# Patient Record
Sex: MALE | Race: ASIAN | Employment: FULL TIME | URBAN - METROPOLITAN AREA
[De-identification: names, ages, dates, MRNs, and addresses within clinical notes are randomized per-mention and may not be internally consistent; named-entity substitution may affect disease eponyms.]

---

## 2017-12-19 ENCOUNTER — HOSPITAL ENCOUNTER (EMERGENCY)
Facility: CLINIC | Age: 42
Discharge: HOME | End: 2017-12-19
Attending: EMERGENCY MEDICINE | Admitting: EMERGENCY MEDICINE
Payer: COMMERCIAL

## 2017-12-19 VITALS
SYSTOLIC BLOOD PRESSURE: 123 MMHG | HEART RATE: 84 BPM | OXYGEN SATURATION: 95 % | TEMPERATURE: 98.9 F | RESPIRATION RATE: 20 BRPM | DIASTOLIC BLOOD PRESSURE: 94 MMHG

## 2017-12-19 DIAGNOSIS — R50.9 FEBRILE ILLNESS, ACUTE: ICD-10-CM

## 2017-12-19 DIAGNOSIS — E87.6 HYPOKALEMIA: ICD-10-CM

## 2017-12-19 LAB
ANION GAP SERPL CALCULATED.3IONS-SCNC: 7 MMOL/L (ref 3–14)
BUN SERPL-MCNC: 12 MG/DL (ref 7–30)
CALCIUM SERPL-MCNC: 8.4 MG/DL (ref 8.5–10.1)
CHLORIDE SERPL-SCNC: 97 MMOL/L (ref 94–109)
CO2 SERPL-SCNC: 29 MMOL/L (ref 20–32)
CREAT SERPL-MCNC: 1.17 MG/DL (ref 0.66–1.25)
FLUAV+FLUBV AG SPEC QL: NEGATIVE
FLUAV+FLUBV AG SPEC QL: NEGATIVE
GFR SERPL CREATININE-BSD FRML MDRD: 68 ML/MIN/1.7M2
GLUCOSE SERPL-MCNC: 136 MG/DL (ref 70–99)
LACTATE BLD-SCNC: 1.4 MMOL/L (ref 0.7–2)
POTASSIUM SERPL-SCNC: 2.8 MMOL/L (ref 3.4–5.3)
SODIUM SERPL-SCNC: 133 MMOL/L (ref 133–144)
SPECIMEN SOURCE: NORMAL

## 2017-12-19 PROCEDURE — 36415 COLL VENOUS BLD VENIPUNCTURE: CPT | Performed by: EMERGENCY MEDICINE

## 2017-12-19 PROCEDURE — 80048 BASIC METABOLIC PNL TOTAL CA: CPT | Performed by: EMERGENCY MEDICINE

## 2017-12-19 PROCEDURE — 87040 BLOOD CULTURE FOR BACTERIA: CPT | Performed by: EMERGENCY MEDICINE

## 2017-12-19 PROCEDURE — 83605 ASSAY OF LACTIC ACID: CPT | Performed by: EMERGENCY MEDICINE

## 2017-12-19 PROCEDURE — 87804 INFLUENZA ASSAY W/OPTIC: CPT | Performed by: EMERGENCY MEDICINE

## 2017-12-19 PROCEDURE — 96365 THER/PROPH/DIAG IV INF INIT: CPT

## 2017-12-19 PROCEDURE — 25000128 H RX IP 250 OP 636: Performed by: EMERGENCY MEDICINE

## 2017-12-19 PROCEDURE — 25000132 ZZH RX MED GY IP 250 OP 250 PS 637: Performed by: EMERGENCY MEDICINE

## 2017-12-19 PROCEDURE — 99284 EMERGENCY DEPT VISIT MOD MDM: CPT | Mod: 25

## 2017-12-19 PROCEDURE — 93005 ELECTROCARDIOGRAM TRACING: CPT

## 2017-12-19 RX ORDER — POTASSIUM CL/LIDO/0.9 % NACL 10MEQ/0.1L
10 INTRAVENOUS SOLUTION, PIGGYBACK (ML) INTRAVENOUS ONCE
Status: COMPLETED | OUTPATIENT
Start: 2017-12-19 | End: 2017-12-19

## 2017-12-19 RX ORDER — LOSARTAN POTASSIUM AND HYDROCHLOROTHIAZIDE 12.5; 5 MG/1; MG/1
1 TABLET ORAL DAILY
COMMUNITY

## 2017-12-19 RX ORDER — POTASSIUM CHLORIDE 1500 MG/1
20 TABLET, EXTENDED RELEASE ORAL 2 TIMES DAILY
Qty: 6 TABLET | Refills: 0 | Status: SHIPPED | OUTPATIENT
Start: 2017-12-19 | End: 2017-12-22

## 2017-12-19 RX ORDER — POTASSIUM CHLORIDE 1500 MG/1
20 TABLET, EXTENDED RELEASE ORAL ONCE
Status: COMPLETED | OUTPATIENT
Start: 2017-12-19 | End: 2017-12-19

## 2017-12-19 RX ADMIN — POTASSIUM CHLORIDE 20 MEQ: 1500 TABLET, EXTENDED RELEASE ORAL at 11:30

## 2017-12-19 RX ADMIN — Medication 10 MEQ: at 11:38

## 2017-12-19 ASSESSMENT — ENCOUNTER SYMPTOMS
DIARRHEA: 0
ABDOMINAL PAIN: 0
VOMITING: 0
NECK STIFFNESS: 0
BACK PAIN: 0
COUGH: 1
FEVER: 1
NAUSEA: 0
HEADACHES: 0
WEAKNESS: 0
CHILLS: 0
NECK PAIN: 0
SHORTNESS OF BREATH: 0
NUMBNESS: 0
DIZZINESS: 0

## 2017-12-19 NOTE — ED PROVIDER NOTES
History     Chief Complaint:  Fever    HPI   Erika Faulkner is a 42 year old male who presents from clinic for 3 days of clinic. The patient states that approximately 3 days ago on Saturday night he developed shaking chills and had a measured fever of 102 F at home. He was able to take Tylenol at home with good control of his temperature down to 98 F. Since that time however his temperature continued to rebound primarily at night. He was seen in clinic yesterday where he had an elevated white count of 11.9 and was referred to [facility]. He proceeded there but then left before being evaluated by an ED physician as he reported feeling better. However, he again had chills last night and has had another fever, prompting him to come here for evaluation. The patient denies any other symptoms with his fever including unintentional weight loss, back pain, nor loss of consciousness when he has the shaking episodes. The patient does note that he used to have shaking type illnesses whenever he ate large amounts of meat though he has not had any significant amount recently. He otherwise denies other symptoms such as runny nose, sore throat, cough, headache, chest pain, shortness of breath, abdominal pain, diarrhea, rashes. No recent travel outside the country.  No hx of IVDU.  No hx of immunocompromising conditions.  No other concerns are voiced at present.    Allergies:  Sulfa Drugs      Medications:    Tylenol  Losartan potassium     Past Medical History:    Hypertension     Past Surgical History:    History reviewed. No pertinent surgical history.     Family History:    History reviewed. No pertinent family history.      Social History:  Patient presents alone      Review of Systems   Constitutional: Positive for fever. Negative for chills.   Respiratory: Positive for cough. Negative for shortness of breath.    Gastrointestinal: Negative for abdominal pain, diarrhea, nausea and vomiting.   Musculoskeletal: Negative for  back pain, neck pain and neck stiffness.   Skin: Negative for rash.   Neurological: Negative for dizziness, weakness, numbness and headaches.   All other systems reviewed and are negative.      Physical Exam     Patient Vitals for the past 24 hrs:   BP Temp Temp src Pulse Heart Rate Resp SpO2   12/19/17 1300 (!) 123/94 98.9  F (37.2  C) - - 81 - 95 %   12/19/17 1215 123/81 98.8  F (37.1  C) - - 73 - 95 %   12/19/17 1200 112/76 - - - 73 - 95 %   12/19/17 1145 107/68 - - - 76 - 96 %   12/19/17 1144 103/65 - - - 75 - 97 %   12/19/17 1125 - - - - - - 97 %   12/19/17 1120 - - - - - - 96 %   12/19/17 1105 - - - - - - 97 %   12/19/17 1100 - - - - - - 99 %   12/19/17 1055 - - - - - - 98 %   12/19/17 1050 - - - - - - 97 %   12/19/17 1031 136/84 99  F (37.2  C) Oral 84 84 20 95 %      Physical Exam  General:                        Well-nourished                        Speaking in full sentences                        Well appearing  Eyes:                        Conjunctiva without injection or scleral icterus                        PERRL  ENT:                        Moist mucous membranes                        Posterior oropharynx clear without erythema or exudate                        Nares patent                        Pinnae normal  Neck:                        Full ROM                        No stiffness appreciated                        No lymphadenopathy  Resp:                        Lungs CTAB                        No crackles, wheezing or audible rubs                        Good air movement  CV:                                        Normal rate, regular rhythm                        S1 and S2 present                        No murmur, gallop or rub  GI:                        BS present                        Abdomen soft without distention                        Non-tender to light and deep palpation                        No guarding or rebound tenderness  Skin:                        Warm, dry, well perfused                         No rashes or open wounds on exposed skin                        No petechiae or purpura                        No vesicles or bullae              No skin changes about hand.  MSK:                        Moves all extremities                        No focal deformities or swelling  Neuro:                        Alert                        Answers questions appropriately                        Moves all extremities equally  Psych:                        Normal affect, normal mood    Emergency Department Course     Procedures:  BMP: Glucose 136 (H), Potassium 2.8 (L), Ca 8.4 (L), o/w WNL (Creatinine 1.17)   Lactic Acid: 1.4  Blood Culture x2: Pending d  Influenza A/B: Negative    Interventions:  1130 - Potassium chloride 20 mEq PO  1138 - Potassium chloride 10 mEq IV intermittent    Emergency Department Course:  Past medical records, nursing notes, and vitals reviewed.  1030: I performed an exam of the patient and obtained history, as documented above.    IV inserted and blood drawn.     1339: I discussed the case with Dr. Bynum of infectious disease who recommended supportive cares.    1341: I rechecked the patient. Findings and plan explained to the Patient. Patient discharged home with instructions regarding supportive care, medications, and reasons to return. The importance of close follow-up was reviewed.      Impression & Plan      Medical Decision Making:  Erika Faulkner is a 42 year old male who is presenting to the ER for evaluation of fever. Vital signs on presentation as above. Records are reviewed yesterday from Urgent Care, where he did have a chest x-ray and blood work performed and then was referred to the ER with concern for a bacterial infection. He presents today for further evaluation, again in the setting of a recurrent fever this morning. At this point, the exact etiology of the patient's febrile illness is not entirely clear. He notes specifically no symptoms of cough,  rhinorrhea, nasal congestion, sore throat to suggest respiratory process and pulmonary exam is clear. RST is negative.  Influenza testing negative.  Chest X-ray is negative for pneumonia yesterday. I feel repeat X-ray can be deferred safely. He denies associated urinary symptoms and exhibits no flank pain to suggest urinary tract pathology. Abdominal exam is soft without localizing tenderness in the absence of vomiting, diarrhea, or pain. I feel referred intraabdominal process to be unlikely. He notes no symptoms of headache, neck stiffness, exhibits full range of motion on exam and is with normal mental status. Therefore I feel this is unlikely to represent meningitis or encephalitis. I appreciate no audible cardiac murmur and no peripheral manifestations to suggest endocarditis or peripheral embolic disease. He exhibits no skin changes of petechiae or purpura. He has no localized joint swelling or overlying erythema. He has no recent travel outside of the state or country thus is not at increased risk for malaria.  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 He has no history of underlying immunocompromising conditions. I discussed the case with Dr. Bynum who recommended symptomatic cares. Here in the ED, lactate returned normal. Blood cultures x2 are obtained and are presently pending. He was found to have a K of 2.8 (IV and PO replacement provided).  He acknowledged a history of this previously.  Recommended holding BP medication today, and KCl provided for DC. As the patient is clinically well appearing, offers no other complaints, is hemodynamically stable, and afebrile I feel he is safe for discharge home. I recommended he follow up with PCP in 1-2 days for reevaluation and repeat K measurement.  Discussed he may require adjustment of BP medication. Return to the ED with any new or troubling symptoms, or other concerns. All questions were answered prior to discharge.     Diagnosis:     ICD-10-CM   1. Febrile illness, acute R50.9       2. Hypokalemia E87.6     Discharge Medications:   Details   potassium chloride SA (KLOR-CON) 20 MEQ CR tablet Take 1 tablet (20 mEq) by mouth 2 times daily for 3 days, Disp-6 tablet, R-0, Local Print          Davion Paul  12/19/2017   [facility] EMERGENCY DEPARTMENT  I, Davion Miller, am serving as a scribe at 10:30 AM on 12/19/2017 to document services personally performed by Pedro May MD based on my observations and the provider's statements to me.       Pedro May MD  12/19/17 1651

## 2017-12-19 NOTE — ED AVS SNAPSHOT
[facility] Emergency Department    [address]    [facility] [address]    Phone:  [phone]    Fax:  [phone]                                       Erika Faulkner   MRN: [id number]    Department:  [facility] Emergency Department   Date of Visit:  12/19/2017           After Visit Summary Signature Page     I have received my discharge instructions, and my questions have been answered. I have discussed any challenges I see with this plan with the nurse or doctor.    ..........................................................................................................................................  Patient/Patient Representative Signature      ..........................................................................................................................................  Patient Representative Print Name and Relationship to Patient    ..................................................               ................................................  Date                                            Time    ..........................................................................................................................................  Reviewed by Signature/Title    ...................................................              ..............................................  Date                                                            Time

## 2017-12-19 NOTE — ED AVS SNAPSHOT
[facility] Emergency Department    [address]    [facility] [address]    Phone:  [phone]    Fax:  [phone]                                       Erika Faulkner   MRN: [id number]    Department:  [facility] Emergency Department   Date of Visit:  12/19/2017           Patient Information     Date Of Birth          1975        Your diagnoses for this visit were:     Febrile illness, acute     Hypokalemia        You were seen by Pedro May MD.      Follow-up Information     Follow up with [facility]. Schedule an appointment as soon as possible for a visit in 1 day.    Contact information:    [address] [facility] [id number] [phone]          Follow up with [facility] Emergency Department.    Specialty:  EMERGENCY MEDICINE    Why:  If symptoms worsen    Contact information:    [address]  [facility] [id number] [phone]        Discharge Instructions       Follow-up:  Please follow-up with your primary care provider in 1 days for re-evaluation and discussion of your visit to the emergency department today.    Home treatments:  Recommended home therapies include rest, fluids, tylenol/ibuprofen for fever, potassium supplementation, and follow-up tomorrow.    New prescriptions:  Potassium    Return precautions:  Warning signs which should prompt you to return to the ER include worsening chills, vomiting, diarrhea, rash, headache, joint pain, or any other new or troubling symptoms.  We are always happy to see you again.    Discharge Instructions  Fever    You have been seen today for a fever. Fever is a normal body reaction to illness or inflammation. Fever is a sign that your body is doing what it should to fight something off. Fever is not dangerous, but it can make you feel miserable, and you will probably feel better if you get your fever to go down. Most infections are caused by a  virus, and antibiotics will not help; your provider will tell you whether antibiotics are needed in your case. At this time your provider does not find that your fever is a sign of anything dangerous or life-threatening.  However, sometimes the signs of serious illness do not show up right away so additional care may be necessary.    Generally, every Emergency Department visit should have a follow-up clinic visit with either a primary or a specialty clinic/provider. Please follow-up as instructed by your emergency provider today.    What can I do to help myself?    Fill any prescriptions the provider gave you and take them right away--especially antibiotics.    If you have a fever, get plenty of rest and drink lots of fluids, especially water.    What clothes or blankets you have on will not change your fever. Do what is comfortable for you.    Bathing or sponging in lukewarm water may help you feel better.    Tylenol  (acetaminophen), Motrin  (ibuprofen), or Advil  (ibuprofen) help bring fever down and may help you feel more comfortable. Be sure to read and follow the package directions, and ask your provider if you have questions.    Do not drink alcohol.    Return to the Emergency Department if:    Any of the symptoms you have get much worse.    You seem very sick, like being too weak to get up.    You have any new symptoms, especially serious things like abdominal (belly) pain or chest pain.    You are short of breath.    You have a severe headache.    You are vomiting (throwing up) so much you cannot keep fluids or medicines down.    You have confusion or seem unusually drowsy.    You have a seizure.    You have anything else that worries you.  If you were given a prescription for medicine here today, be sure to read all of the information (including the package insert) that comes with your prescription.  This will include important information about the medicine, its side effects, and any warnings that you  need to know about.  The pharmacist who fills the prescription can provide more information and answer questions you may have about the medicine.  If you have questions or concerns that the pharmacist cannot address, please call or return to the Emergency Department.   Remember that you can always come back to the Emergency Department if you are not able to see your regular provider in the amount of time listed above, if you get any new symptoms, or if there is anything that worries you.          24 Hour Appointment Hotline       To make an appointment at any [facility], call [phone] ([phone]). If you don't have a family doctor or clinic, we will help you find one. [facility] clinics are conveniently located to serve the needs of you and your family.             Review of your medicines      START taking        Dose / Directions Last dose taken    potassium chloride SA 20 MEQ CR tablet   Commonly known as:  KLOR-CON   Dose:  20 mEq   Quantity:  6 tablet        Take 1 tablet (20 mEq) by mouth 2 times daily for 3 days   Refills:  0          Our records show that you are taking the medicines listed below. If these are incorrect, please call your family doctor or clinic.        Dose / Directions Last dose taken    losartan-hydrochlorothiazide 50-12.5 MG per tablet   Commonly known as:  HYZAAR   Dose:  1 tablet        Take 1 tablet by mouth daily   Refills:  0        TYLENOL PO        Refills:  0                Prescriptions were sent or printed at these locations (1 Prescription)                   Other Prescriptions                Printed at Department/Unit printer (1 of 1)         potassium chloride SA (KLOR-CON) 20 MEQ CR tablet                Procedures and tests performed during your visit     Procedure/Test Number of Times Performed    Basic metabolic panel (BMP) 1    Blood culture 2    Influenza A/B antigen 1    Lactic acid whole blood 1      Orders Needing Specimen Collection     None       Pending Results     Date and Time Order Name Status Description    12/19/2017 1053 Blood culture In process     12/19/2017 1053 Blood culture In process             Pending Culture Results     Date and Time Order Name Status Description    12/19/2017 1053 Blood culture In process     12/19/2017 1053 Blood culture In process             Pending Results Instructions     If you had any lab results that were not finalized at the time of your Discharge, you can call the ED Lab Result RN at [phone]. You will be contacted by this team for any positive Lab results or changes in treatment. The nurses are available 7 days a week from 10A to 6:30P.  You can leave a message 24 hours per day and they will return your call.        Test Results From Your Hospital Stay        12/19/2017 11:22 AM      Component Results     Component Value Ref Range & Units Status    Sodium 133 133 - 144 mmol/L Final    Potassium 2.8 (L) 3.4 - 5.3 mmol/L Final    Chloride 97 94 - 109 mmol/L Final    Carbon Dioxide 29 20 - 32 mmol/L Final    Anion Gap 7 3 - 14 mmol/L Final    Glucose 136 (H) 70 - 99 mg/dL Final    Urea Nitrogen 12 7 - 30 mg/dL Final    Creatinine 1.17 0.66 - 1.25 mg/dL Final    GFR Estimate 68 >60 mL/min/1.7m2 Final    Non  GFR Calc    GFR Estimate If Black 83 >60 mL/min/1.7m2 Final    African American GFR Calc    Calcium 8.4 (L) 8.5 - 10.1 mg/dL Final         12/19/2017 11:02 AM      Component Results     Component Value Ref Range & Units Status    Lactic Acid 1.4 0.7 - 2.0 mmol/L Final         12/19/2017 10:59 AM         12/19/2017 11:14 AM         12/19/2017 12:07 PM      Component Results     Component Value Ref Range & Units Status    Influenza A/B Agn Specimen Nares  Final    Influenza A Negative NEG^Negative Final    Influenza B Negative NEG^Negative Final    Test results must be correlated with clinical data. If necessary, results   should be confirmed by a molecular assay or viral culture.                   Clinical Quality Measure: Blood Pressure Screening     Your blood pressure was checked while you were in the emergency department today. The last reading we obtained was  BP: 123/81 . Please read the guidelines below about what these numbers mean and what you should do about them.  If your systolic blood pressure (the top number) is less than 120 and your diastolic blood pressure (the bottom number) is less than 80, then your blood pressure is normal. There is nothing more that you need to do about it.  If your systolic blood pressure (the top number) is 120-139 or your diastolic blood pressure (the bottom number) is 80-89, your blood pressure may be higher than it should be. You should have your blood pressure rechecked within a year by a primary care provider.  If your systolic blood pressure (the top number) is 140 or greater or your diastolic blood pressure (the bottom number) is 90 or greater, you may have high blood pressure. High blood pressure is treatable, but if left untreated over time it can put you at risk for heart attack, stroke, or kidney failure. You should have your blood pressure rechecked by a primary care provider within the next 4 weeks.  If your provider in the emergency department today gave you specific instructions to follow-up with your doctor or provider even sooner than that, you should follow that instruction and not wait for up to 4 weeks for your follow-up visit.        Thank you for choosing [facility]       Thank you for choosing [facility] for your care. Our goal is always to provide you with excellent care. Hearing back from our patients is one way we can continue to improve our services. Please take a few minutes to complete the written survey that you may receive in the mail after you visit with us. Thank you!        [facility] Information     [employer] lets you send messages to your doctor, view your test results, renew your prescriptions, schedule appointments and more. To sign  "up, go to [email or web address] . Click on \"Log in\" on the left side of the screen, which will take you to the Welcome page. Then click on \"Sign up Now\" on the right side of the page.     You will be asked to enter the access code listed below, as well as some personal information. Please follow the directions to create your username and password.     Your access code is: [email or web address]  Expires: 3/19/2018  1:43 PM     Your access code will  in 90 days. If you need help or a new code, please call your [facility] clinic or [phone].        Care EveryWhere ID     This is your Care EveryWhere ID. This could be used by other organizations to access your [facility] medical records  [id number]        Equal Access to Services     LAZARO DIEHL : Jose Aranda, alfie christopher, mauro humphreys, sunita ramirez. So [facility] [phone].    ATENCIÓN: Si habla español, tiene a cheung disposición servicios gratuitos de asistencia lingüística. Llame al [phone].    We comply with applicable federal civil rights laws and [place] laws. We do not discriminate on the basis of race, color, national origin, age, disability, sex, sexual orientation, or gender identity.            After Visit Summary       This is your record. Keep this with you and show to your community pharmacist(s) and doctor(s) at your next visit.                  "

## 2017-12-19 NOTE — DISCHARGE INSTRUCTIONS
Follow-up:  Please follow-up with your primary care provider in 1 days for re-evaluation and discussion of your visit to the emergency department today.    Home treatments:  Recommended home therapies include rest, fluids, tylenol/ibuprofen for fever, potassium supplementation, and follow-up tomorrow.    New prescriptions:  Potassium    Return precautions:  Warning signs which should prompt you to return to the ER include worsening chills, vomiting, diarrhea, rash, headache, joint pain, or any other new or troubling symptoms.  We are always happy to see you again.    Discharge Instructions  Fever    You have been seen today for a fever. Fever is a normal body reaction to illness or inflammation. Fever is a sign that your body is doing what it should to fight something off. Fever is not dangerous, but it can make you feel miserable, and you will probably feel better if you get your fever to go down. Most infections are caused by a virus, and antibiotics will not help; your provider will tell you whether antibiotics are needed in your case. At this time your provider does not find that your fever is a sign of anything dangerous or life-threatening.  However, sometimes the signs of serious illness do not show up right away so additional care may be necessary.    Generally, every Emergency Department visit should have a follow-up clinic visit with either a primary or a specialty clinic/provider. Please follow-up as instructed by your emergency provider today.    What can I do to help myself?    Fill any prescriptions the provider gave you and take them right away--especially antibiotics.    If you have a fever, get plenty of rest and drink lots of fluids, especially water.    What clothes or blankets you have on will not change your fever. Do what is comfortable for you.    Bathing or sponging in lukewarm water may help you feel better.    Tylenol  (acetaminophen), Motrin  (ibuprofen), or Advil  (ibuprofen) help bring  fever down and may help you feel more comfortable. Be sure to read and follow the package directions, and ask your provider if you have questions.    Do not drink alcohol.    Return to the Emergency Department if:    Any of the symptoms you have get much worse.    You seem very sick, like being too weak to get up.    You have any new symptoms, especially serious things like abdominal (belly) pain or chest pain.    You are short of breath.    You have a severe headache.    You are vomiting (throwing up) so much you cannot keep fluids or medicines down.    You have confusion or seem unusually drowsy.    You have a seizure.    You have anything else that worries you.  If you were given a prescription for medicine here today, be sure to read all of the information (including the package insert) that comes with your prescription.  This will include important information about the medicine, its side effects, and any warnings that you need to know about.  The pharmacist who fills the prescription can provide more information and answer questions you may have about the medicine.  If you have questions or concerns that the pharmacist cannot address, please call or return to the Emergency Department.   Remember that you can always come back to the Emergency Department if you are not able to see your regular provider in the amount of time listed above, if you get any new symptoms, or if there is anything that worries you.

## 2017-12-19 NOTE — LETTER
December 19, 2017      To Whom It May Concern:      Erika Faulkner was seen in our Emergency Department today, 12/19/17.  I expect his condition to improve over the next 2-3 days.  He may return to work/school when improved.    Sincerely,        Erika Nolan RN

## 2017-12-19 NOTE — ED NOTES
Pt reports sent over from  [facility] for febrile illness, body aches, chills x 3 days; states had multiple tests including UA, strep, influenza; WBC elevated.  States unable to find source of illness. ABCs intact.

## 2017-12-25 LAB
BACTERIA SPEC CULT: NO GROWTH
BACTERIA SPEC CULT: NO GROWTH
Lab: NORMAL
Lab: NORMAL
SPECIMEN SOURCE: NORMAL
SPECIMEN SOURCE: NORMAL

## 2021-06-18 ENCOUNTER — HOSPITAL ENCOUNTER (EMERGENCY)
Facility: CLINIC | Age: 46
Discharge: HOME | End: 2021-06-18
Attending: EMERGENCY MEDICINE | Admitting: EMERGENCY MEDICINE
Payer: COMMERCIAL

## 2021-06-18 VITALS
TEMPERATURE: 99.5 F | OXYGEN SATURATION: 95 % | SYSTOLIC BLOOD PRESSURE: 134 MMHG | DIASTOLIC BLOOD PRESSURE: 88 MMHG | HEART RATE: 89 BPM | RESPIRATION RATE: 16 BRPM

## 2021-06-18 DIAGNOSIS — J02.0 STREPTOCOCCAL SORE THROAT: ICD-10-CM

## 2021-06-18 LAB
ALBUMIN SERPL-MCNC: 3.3 G/DL (ref 3.4–5)
ALBUMIN UR-MCNC: NEGATIVE MG/DL
ALP SERPL-CCNC: 79 U/L (ref 40–150)
ALT SERPL W P-5'-P-CCNC: 34 U/L (ref 0–70)
ANION GAP SERPL CALCULATED.3IONS-SCNC: 4 MMOL/L (ref 3–14)
APPEARANCE UR: CLEAR
AST SERPL W P-5'-P-CCNC: 22 U/L (ref 0–45)
BASOPHILS # BLD AUTO: 0.1 10E9/L (ref 0–0.2)
BASOPHILS NFR BLD AUTO: 0.8 %
BILIRUB SERPL-MCNC: 0.7 MG/DL (ref 0.2–1.3)
BILIRUB UR QL STRIP: NEGATIVE
BUN SERPL-MCNC: 14 MG/DL (ref 7–30)
CALCIUM SERPL-MCNC: 8.8 MG/DL (ref 8.5–10.1)
CHLORIDE SERPL-SCNC: 105 MMOL/L (ref 94–109)
CO2 SERPL-SCNC: 29 MMOL/L (ref 20–32)
COLOR UR AUTO: ABNORMAL
CREAT SERPL-MCNC: 1.05 MG/DL (ref 0.66–1.25)
DEPRECATED S PYO AG THROAT QL EIA: POSITIVE
DIFFERENTIAL METHOD BLD: ABNORMAL
EOSINOPHIL # BLD AUTO: 0.1 10E9/L (ref 0–0.7)
EOSINOPHIL NFR BLD AUTO: 1.4 %
ERYTHROCYTE [DISTWIDTH] IN BLOOD BY AUTOMATED COUNT: 14.5 % (ref 10–15)
GFR SERPL CREATININE-BSD FRML MDRD: 85 ML/MIN/{1.73_M2}
GLUCOSE SERPL-MCNC: 109 MG/DL (ref 70–99)
GLUCOSE UR STRIP-MCNC: NEGATIVE MG/DL
HCT VFR BLD AUTO: 37.8 % (ref 40–53)
HGB BLD-MCNC: 12.3 G/DL (ref 13.3–17.7)
HGB UR QL STRIP: ABNORMAL
IMM GRANULOCYTES # BLD: 0 10E9/L (ref 0–0.4)
IMM GRANULOCYTES NFR BLD: 0.3 %
KETONES UR STRIP-MCNC: NEGATIVE MG/DL
LABORATORY COMMENT REPORT: NORMAL
LEUKOCYTE ESTERASE UR QL STRIP: NEGATIVE
LYMPHOCYTES # BLD AUTO: 1.6 10E9/L (ref 0.8–5.3)
LYMPHOCYTES NFR BLD AUTO: 20.3 %
MCH RBC QN AUTO: 26.7 PG (ref 26.5–33)
MCHC RBC AUTO-ENTMCNC: 32.5 G/DL (ref 31.5–36.5)
MCV RBC AUTO: 82 FL (ref 78–100)
MONOCYTES # BLD AUTO: 0.7 10E9/L (ref 0–1.3)
MONOCYTES NFR BLD AUTO: 8.1 %
NEUTROPHILS # BLD AUTO: 5.5 10E9/L (ref 1.6–8.3)
NEUTROPHILS NFR BLD AUTO: 69.1 %
NITRATE UR QL: NEGATIVE
NRBC # BLD AUTO: 0 10*3/UL
NRBC BLD AUTO-RTO: 0 /100
PH UR STRIP: 6.5 PH (ref 5–7)
PLATELET # BLD AUTO: 199 10E9/L (ref 150–450)
POTASSIUM SERPL-SCNC: 3 MMOL/L (ref 3.4–5.3)
PROT SERPL-MCNC: 7.2 G/DL (ref 6.8–8.8)
RBC # BLD AUTO: 4.61 10E12/L (ref 4.4–5.9)
RBC #/AREA URNS AUTO: 2 /HPF (ref 0–2)
SARS-COV-2 RNA RESP QL NAA+PROBE: NEGATIVE
SODIUM SERPL-SCNC: 138 MMOL/L (ref 133–144)
SOURCE: ABNORMAL
SP GR UR STRIP: 1.01 (ref 1–1.03)
SPECIMEN SOURCE: ABNORMAL
SPECIMEN SOURCE: NORMAL
UROBILINOGEN UR STRIP-MCNC: NORMAL MG/DL (ref 0–2)
WBC # BLD AUTO: 8 10E9/L (ref 4–11)
WBC #/AREA URNS AUTO: 0 /HPF (ref 0–5)

## 2021-06-18 PROCEDURE — 81001 URINALYSIS AUTO W/SCOPE: CPT | Performed by: EMERGENCY MEDICINE

## 2021-06-18 PROCEDURE — 80053 COMPREHEN METABOLIC PANEL: CPT | Performed by: EMERGENCY MEDICINE

## 2021-06-18 PROCEDURE — 87635 SARS-COV-2 COVID-19 AMP PRB: CPT | Performed by: EMERGENCY MEDICINE

## 2021-06-18 PROCEDURE — 85025 COMPLETE CBC W/AUTO DIFF WBC: CPT | Performed by: EMERGENCY MEDICINE

## 2021-06-18 PROCEDURE — 99283 EMERGENCY DEPT VISIT LOW MDM: CPT

## 2021-06-18 PROCEDURE — 36415 COLL VENOUS BLD VENIPUNCTURE: CPT | Performed by: EMERGENCY MEDICINE

## 2021-06-18 PROCEDURE — C9803 HOPD COVID-19 SPEC COLLECT: HCPCS

## 2021-06-18 PROCEDURE — 87040 BLOOD CULTURE FOR BACTERIA: CPT | Performed by: EMERGENCY MEDICINE

## 2021-06-18 PROCEDURE — 87880 STREP A ASSAY W/OPTIC: CPT | Performed by: EMERGENCY MEDICINE

## 2021-06-18 PROCEDURE — 84145 PROCALCITONIN (PCT): CPT | Performed by: EMERGENCY MEDICINE

## 2021-06-18 RX ORDER — PENICILLIN V POTASSIUM 500 MG/1
500 TABLET, FILM COATED ORAL 3 TIMES DAILY
Qty: 30 TABLET | Refills: 0 | Status: SHIPPED | OUTPATIENT
Start: 2021-06-18 | End: 2021-06-28

## 2021-06-18 ASSESSMENT — ENCOUNTER SYMPTOMS
COUGH: 0
VOMITING: 0
DYSURIA: 0
DIARRHEA: 0
HEMATURIA: 0
CHILLS: 1
FEVER: 1
DIFFICULTY URINATING: 0
ABDOMINAL PAIN: 0
SORE THROAT: 1

## 2021-06-19 LAB — PROCALCITONIN SERPL-MCNC: 0.28 NG/ML

## 2021-06-19 NOTE — ED PROVIDER NOTES
History   Chief Complaint:  Fever       HPI   Erika Faulkner is a 45 year old male with history of hypertension who presents with a fever. Per the patient, for the patient two days he has had a fever, chills, and a mild sore throat. He has been taking Advil, which dropped his temperature down temporarily, but the fever keeps returning. He denies cough, rash, abdominal pain, difficulty urinating, dysuria, hematuria, diarrhea, and vomiting. He is COVID vaccinated and had his second dose on 3/04, per [facility].    Review of Systems   Constitutional: Positive for chills and fever.   HENT: Positive for sore throat.    Respiratory: Negative for cough.    Gastrointestinal: Negative for abdominal pain, diarrhea and vomiting.   Genitourinary: Negative for difficulty urinating, dysuria and hematuria.   Skin: Negative for rash.   All other systems reviewed and are negative.        Allergies:  Sulfa Drugs  Hydrochlorothiazide  Verapamil    Medications:  Losartan  Amlodipine  Furosemide  Phentermine HCl  Metformin    Past Medical History:    Hypertension     Family History:    Father: Unspecified liver disease  Mother: Breast cancer, hypertension, thyroid disorder    Social History:  The patient was unaccompanied to the ER      Physical Exam     Patient Vitals for the past 24 hrs:   BP Temp Temp src Pulse Resp SpO2   06/18/21 2100 134/88 -- -- -- -- 95 %   06/18/21 1926 (!) 175/96 99.5  F (37.5  C) Temporal 89 16 99 %       Physical Exam  Constitutional:       Appearance: He is obese.   HENT:      Head: Normocephalic.      Right Ear: Tympanic membrane normal.      Left Ear: Tympanic membrane normal.      Mouth/Throat:      Mouth: Mucous membranes are moist.      Pharynx: Posterior oropharyngeal erythema present. No oropharyngeal exudate.   Eyes:      Pupils: Pupils are equal, round, and reactive to light.   Cardiovascular:      Rate and Rhythm: Normal rate and regular rhythm.   Pulmonary:      Effort: Pulmonary effort is normal.    Abdominal:      General: Abdomen is flat.      Palpations: Abdomen is soft.   Skin:     General: Skin is warm.      Capillary Refill: Capillary refill takes less than 2 seconds.   Neurological:      Mental Status: He is alert.           Emergency Department Course     Laboratory:    CBC: WBC 8.0, HGB 12.3(L),    CMP: Glucose 109(H), Potassium 3.0(L), Albumin 3.3(L) o/w WNL (Creatinine 1.05)  Blood Culture: In Process  Procalcitonin: In Process    UA with micro: Blood Small(A) o/w WNL    Rapid Strep: Positive(A)    Symptomatic COVID-19 by PCR Swab: In Process    Emergency Department Course:    Reviewed:  I reviewed nursing notes, vitals and past medical history    Assessments:  2105 I obtained history and examined the patient as noted above.   2218 I rechecked the patient and explained findings.     Disposition:  The patient was discharged to home.       Impression & Plan         Medical Decision Making:  Patient presents with a fever and chills for the last 2 days.  Patient denies major symptoms does mention sore throat when asked examination is relatively benign patient is 45 years old and no immunosuppression but due to shaking chills and fever blood cultures and sepsis evaluation was performed this was negative.  Interestingly a strep throat test was positive unsure if this really requires treatment at this point but due to chills and positive strep test will initiate on penicillin patient is pending cultures but is safe for discharge.  Patient is offered reassurance and discharged home.    Covid-19  Erika Faulkner was evaluated during a global COVID-19 pandemic, which necessitated consideration that the patient might be at risk for infection with the SARS-CoV-2 virus that causes COVID-19.   Applicable protocols for evaluation were followed during the patient's care.   COVID-19 was considered as part of the patient's evaluation. The plan for testing is:  a test was obtained during this  visit.    Diagnosis:    ICD-10-CM    1. Streptococcal sore throat  J02.0        Discharge Medications:  New Prescriptions    PENICILLIN V (VEETID) 500 MG TABLET    Take 1 tablet (500 mg) by mouth 3 times daily for 10 days       Scribe Disclosure:  I, Luis Pavon, am serving as a scribe at 9:01 PM on 6/18/2021 to document services personally performed by Pedro Valiente MD based on my observations and the provider's statements to me.        Pedro Valiente MD  06/19/21 1750

## 2021-06-19 NOTE — ED TRIAGE NOTES
Pt here for a fever x2 days. Denies other symptoms. Pt states that motrin drops his fever but it keeps returning. T max 101. A+Ox4, no acute signs of distress

## 2021-06-19 NOTE — DISCHARGE INSTRUCTIONS
We are treating you with a course of antibiotics for strep throat. As we have discussed to the chills we have done blood cultures to assess for bacteria in the bloodstream if these are positive the hospital call you and ask you to return. Due to your concerns even though you were immunized we did send a Covid test if this is positive you will hear from us you can also check using [facility]. Complete full course of antibiotics and return with new or worsening condition. Continue Tylenol or ibuprofen for fever.

## 2021-06-24 LAB
BACTERIA SPEC CULT: NO GROWTH
SPECIMEN SOURCE: NORMAL

## 2021-06-25 LAB
BACTERIA SPEC CULT: NO GROWTH
Lab: NORMAL
SPECIMEN SOURCE: NORMAL

## 2021-07-31 ENCOUNTER — APPOINTMENT (OUTPATIENT)
Dept: CT IMAGING | Facility: CLINIC | Age: 46
End: 2021-07-31
Attending: EMERGENCY MEDICINE
Payer: COMMERCIAL

## 2021-07-31 ENCOUNTER — HOSPITAL ENCOUNTER (EMERGENCY)
Facility: CLINIC | Age: 46
Discharge: HOME | End: 2021-07-31
Attending: EMERGENCY MEDICINE | Admitting: EMERGENCY MEDICINE
Payer: COMMERCIAL

## 2021-07-31 VITALS
OXYGEN SATURATION: 99 % | DIASTOLIC BLOOD PRESSURE: 89 MMHG | TEMPERATURE: 97.3 F | WEIGHT: 254 LBS | HEART RATE: 60 BPM | RESPIRATION RATE: 14 BRPM | SYSTOLIC BLOOD PRESSURE: 140 MMHG

## 2021-07-31 DIAGNOSIS — R06.02 SHORTNESS OF BREATH: ICD-10-CM

## 2021-07-31 DIAGNOSIS — E66.01 MORBID OBESITY (H): ICD-10-CM

## 2021-07-31 DIAGNOSIS — I10 ESSENTIAL HYPERTENSION: ICD-10-CM

## 2021-07-31 DIAGNOSIS — K80.20 GALLSTONES: ICD-10-CM

## 2021-07-31 LAB
ALBUMIN SERPL-MCNC: 3.8 G/DL (ref 3.4–5)
ALP SERPL-CCNC: 92 U/L (ref 40–150)
ALT SERPL W P-5'-P-CCNC: 29 U/L (ref 0–70)
ANION GAP SERPL CALCULATED.3IONS-SCNC: 4 MMOL/L (ref 3–14)
AST SERPL W P-5'-P-CCNC: 18 U/L (ref 0–45)
BASOPHILS # BLD AUTO: 0 10E3/UL (ref 0–0.2)
BASOPHILS NFR BLD AUTO: 1 %
BILIRUB SERPL-MCNC: 0.5 MG/DL (ref 0.2–1.3)
BUN SERPL-MCNC: 8 MG/DL (ref 7–30)
CALCIUM SERPL-MCNC: 9.8 MG/DL (ref 8.5–10.1)
CHLORIDE BLD-SCNC: 106 MMOL/L (ref 94–109)
CO2 SERPL-SCNC: 31 MMOL/L (ref 20–32)
CREAT SERPL-MCNC: 0.95 MG/DL (ref 0.66–1.25)
D DIMER PPP FEU-MCNC: 0.59 UG/ML FEU (ref 0–0.5)
EOSINOPHIL # BLD AUTO: 0.1 10E3/UL (ref 0–0.7)
EOSINOPHIL NFR BLD AUTO: 2 %
ERYTHROCYTE [DISTWIDTH] IN BLOOD BY AUTOMATED COUNT: 13.9 % (ref 10–15)
GFR SERPL CREATININE-BSD FRML MDRD: >90 ML/MIN/1.73M2
GLUCOSE BLD-MCNC: 99 MG/DL (ref 70–99)
HCT VFR BLD AUTO: 42.2 % (ref 40–53)
HGB BLD-MCNC: 13.7 G/DL (ref 13.3–17.7)
IMM GRANULOCYTES # BLD: 0 10E3/UL
IMM GRANULOCYTES NFR BLD: 0 %
LYMPHOCYTES # BLD AUTO: 1.9 10E3/UL (ref 0.8–5.3)
LYMPHOCYTES NFR BLD AUTO: 31 %
MCH RBC QN AUTO: 26.4 PG (ref 26.5–33)
MCHC RBC AUTO-ENTMCNC: 32.5 G/DL (ref 31.5–36.5)
MCV RBC AUTO: 82 FL (ref 78–100)
MONOCYTES # BLD AUTO: 0.4 10E3/UL (ref 0–1.3)
MONOCYTES NFR BLD AUTO: 6 %
NEUTROPHILS # BLD AUTO: 3.6 10E3/UL (ref 1.6–8.3)
NEUTROPHILS NFR BLD AUTO: 60 %
NRBC # BLD AUTO: 0 10E3/UL
NRBC BLD AUTO-RTO: 0 /100
NT-PROBNP SERPL-MCNC: 8 PG/ML (ref 0–450)
PLATELET # BLD AUTO: 253 10E3/UL (ref 150–450)
POTASSIUM BLD-SCNC: 3.3 MMOL/L (ref 3.4–5.3)
PROT SERPL-MCNC: 7.7 G/DL (ref 6.8–8.8)
RBC # BLD AUTO: 5.18 10E6/UL (ref 4.4–5.9)
SODIUM SERPL-SCNC: 141 MMOL/L (ref 133–144)
TROPONIN I SERPL-MCNC: <0.015 UG/L (ref 0–0.04)
WBC # BLD AUTO: 6 10E3/UL (ref 4–11)

## 2021-07-31 PROCEDURE — 80053 COMPREHEN METABOLIC PANEL: CPT | Performed by: EMERGENCY MEDICINE

## 2021-07-31 PROCEDURE — 84484 ASSAY OF TROPONIN QUANT: CPT | Performed by: EMERGENCY MEDICINE

## 2021-07-31 PROCEDURE — 93005 ELECTROCARDIOGRAM TRACING: CPT

## 2021-07-31 PROCEDURE — 71275 CT ANGIOGRAPHY CHEST: CPT

## 2021-07-31 PROCEDURE — 85025 COMPLETE CBC W/AUTO DIFF WBC: CPT | Performed by: EMERGENCY MEDICINE

## 2021-07-31 PROCEDURE — 99285 EMERGENCY DEPT VISIT HI MDM: CPT | Mod: 25

## 2021-07-31 PROCEDURE — 85379 FIBRIN DEGRADATION QUANT: CPT | Performed by: EMERGENCY MEDICINE

## 2021-07-31 PROCEDURE — 83880 ASSAY OF NATRIURETIC PEPTIDE: CPT | Performed by: EMERGENCY MEDICINE

## 2021-07-31 PROCEDURE — 250N000009 HC RX 250: Performed by: EMERGENCY MEDICINE

## 2021-07-31 PROCEDURE — 36415 COLL VENOUS BLD VENIPUNCTURE: CPT | Performed by: EMERGENCY MEDICINE

## 2021-07-31 PROCEDURE — 250N000011 HC RX IP 250 OP 636: Performed by: EMERGENCY MEDICINE

## 2021-07-31 RX ORDER — IOPAMIDOL 755 MG/ML
500 INJECTION, SOLUTION INTRAVASCULAR ONCE
Status: COMPLETED | OUTPATIENT
Start: 2021-07-31 | End: 2021-07-31

## 2021-07-31 RX ADMIN — SODIUM CHLORIDE 85 ML: 9 INJECTION, SOLUTION INTRAVENOUS at 10:53

## 2021-07-31 RX ADMIN — IOPAMIDOL 85 ML: 755 INJECTION, SOLUTION INTRAVENOUS at 10:52

## 2021-07-31 ASSESSMENT — ENCOUNTER SYMPTOMS
FEVER: 0
SHORTNESS OF BREATH: 1
COUGH: 0

## 2021-07-31 NOTE — ED TRIAGE NOTES
Patient presents to the ED reporting shortness of breath and difficulty taking a deep breath. Patient states he was recently diagnosed with probable sleep apnea but has been having trouble with waking up feeling SOB during the middle of night. Has a sleep study scheduled. States in the past few days has been having trouble with SOB even during the day, which is new.

## 2021-07-31 NOTE — ED PROVIDER NOTES
History   Chief Complaint:  Shortness of Breath       HPI   Erika Faulkner is a 45 year old male with history of HTN, prediabetes, and obesity who presents with shortness of breath. Erika was recently diagnosed with possible sleep apnea, and is scheduled for a sleep study, but recently began feeling short of breath during the day at times, and last night he woke up far more often secondary to feeling short of breath compared to recent baseline with the sleep apnea, so he presents to the ED. He also notes a sensation of pressure on his chest when he has difficulty breathing, and bilateral leg swelling with increased indentation marks from his socks. + orthopnea at night. He describes the shortness of breath as intermittent and provoked by lying down, to the point where he has slept sitting up in a chair for the past two nights, but he denies his symptoms being provoked by exertion. No current chest pain or pressure. Of note, Erika reports gaining 30 pounds within the last few months, and says he has seen his primary doctor, who referred him to a sleep specialist for his sleep apnea. He reports taking his losartan at 7:30 am today as normal, and has been taking amlodipine and metoprolol regularly as well. He denies cough, fever, urinary symptoms, or changes to his medications recently.    Review of Systems   Constitutional: Negative for fever.   Respiratory: Positive for shortness of breath. Negative for cough.    Cardiovascular: Positive for chest pain and leg swelling.   All other systems reviewed and are negative.    Allergies:  Sulfa drugs  Hydrochlorothiazide  Verapamil    Medications:  Losartan  Amlodipine  Metoprolol  Phentermine  Metformin    Past Medical History:    Hypertension   [place]  Gum disease  Obesity    Past Surgical History:    The patient denies past surgical history.      Family History:    Liver disease  Breast cancer  Hypertension  Thyroid disorder    Social History:  Works from  home.    Physical Exam     Patient Vitals for the past 24 hrs:   BP Temp Pulse Resp SpO2 Weight   07/31/21 1139 (!) 140/89 -- 60 14 99 % --   07/31/21 1030 -- -- -- -- 96 % --   07/31/21 1027 -- -- -- -- -- 115.2 kg (254 lb)   07/31/21 1015 (!) 145/98 -- 58 -- 97 % --   07/31/21 1000 (!) 136/100 -- 59 -- 98 % --   07/31/21 0945 (!) 157/101 -- 63 -- 95 % --   07/31/21 0930 (!) 140/90 -- 62 -- 97 % --   07/31/21 0915 (!) 146/100 -- 62 -- 97 % --   07/31/21 0910 (!) 143/96 -- 62 -- 97 % --   07/31/21 0824 (!) 212/121 97.3  F (36.3  C) 75 20 97 % --       Physical Exam  General: Well appearing, nontoxic. Resting comfortably  Head:  Scalp, face, and head appear normal  Eyes:  Pupils are equal, round    Conjunctivae non-injected and sclerae white  ENT:    The external nose is normal    Pinnae are normal  Neck:  Normal range of motion    There is no rigidity noted    Trachea is in the midline  CV:  Heart sounds distant. Regular rate and rhythm     Normal S1/S2, no S3/S4    No murmur or rub. Radial pulses 2+ bilaterally.  Resp:  Lungs are clear and equal bilaterally  There is no tachypnea    No increased work of breathing    No rales, wheezing, or rhonchi  GI:  Abdomen is soft, morbidly obese, no rigidity or guarding    No distension, or mass    No tenderness or rebound tenderness   MS:  Normal muscular tone    Symmetric motor strength    2+ pitting edema to the bilateral ankles.  Skin:  No rash or acute skin lesions noted  Neuro: Awake and alert  Speech is normal and fluent  Moves all extremities spontaneously  Psych:  Normal affect. Appropriate interactions.      Emergency Department Course   ECG  ECG taken at 1002, ECG read at 1011  Sinus bradycardia  Otherwise normal ECG   Rate 55 bpm. FL interval 190 ms. QRS duration 86 ms. QT/QTc 408/390 ms. P-R-T axes 40 18 15.     Imaging:  CT Chest Pulmonary Embolism w Contrast  1.  No pulmonary embolism.  2.  Dilatation of ascending aorta, measuring 4.3 cm in diameter.  3.  No  focal consolidation, infiltrate or mass.  4.  Cholelithiasis.    Reading per radiology.    Laboratory:   CBC: WBC: 6.0, HGB: 13.7, PLT: 253  CMP: Potassium: 3.3 (L), o/w WNL (Creatinine: 0.95)  Troponin (Collected 0946): <0.015  D-dimer: 0.59  BNP: 8    Emergency Department Course:    Reviewed:  I reviewed nursing notes, vitals, past medical history and care everywhere    Assessments/Consults:    ED Course as of Jul 31 1158   Sat Jul 31, 2021   0933 I examined the patient and obtained history.      1140 I rechecked the patient and explained findings.        Disposition:  The patient was discharged to home.       Impression & Plan     Medical Decision Making:  Erika Faulkner is a 45 year old male who presents for subacute dyspnea and orthopnea with difficulty sleeping flat at night. On my evaluation the patient is well-appearing, hemodynamically stable and afebrile. No increased work of breathing, hypoxia or resp distress.  Patient has recently been diagnosed with likely sleep apnea by his primary care physician and is scheduled to have a sleep study next month.  A broad differential diagnosis is considered.  Work-up in the emergency department is thankfully reassuring.  EKG reveals normal sinus rhythm without evidence of ischemia or dysrhythmia.  Troponin and BNP are normal.  D-dimer elevated therefore CTA of the chest was obtained and negative for pulmonary emboli or any other acute thoracic pathology.  No evidence of pneumonia, pulmonary edema, pleural effusions, pneumothorax, interstitial lung disease or other findings which would explain his symptoms.  No pericardial effusion.  The patient has morbid obesity and notes that he has gained 30 pounds recently.  His symptoms may certainly be due to underlying sleep apnea, and cardiovascular deconditioning.  No wheezing or evidence of reactive airway disease/asthma or COPD.  Lab work-up is otherwise reassuring.  No anemia.  CMP and CBC are normal.  At this time there  is no indication for hospitalization.  I have recommended the patient obtain an outpatient echocardiogram to complete a thorough cardiac evaluation.  He has had no recent signs or symptoms to suggest an acute coronary syndrome.  Incidentally noted gallstones were discussed with the patient.  I stressed the importance of close outpatient follow-up with his primary care physician and immediate return to the emergency department for any worsening.  The patient was agreeable to plan of care.  Close return precautions were provided he was discharged in stable condition.    Diagnosis:    ICD-10-CM    1. Shortness of breath  R06.02 Echocardiogram Complete   2. Gallstones  K80.20    3. Morbid obesity (H)  E66.01    4. Essential hypertension  I10 Echocardiogram Complete       Discharge Medications:  Discharge Medication List as of 7/31/2021 11:32 AM          Scribe Disclosure:  IEnder, am serving as a scribe at 9:08 AM on 7/31/2021 to document services personally performed by Ender Ramirez MD based on my observations and the provider's statements to me.     IAlon, am serving as a scribe at 10:27 AM on 7/31/2021 to document services personally performed by Ender Ramirez MD based on my observations and the provider's statements to me.             Ender Ramirez MD  07/31/21 1947

## 2021-08-02 LAB
ATRIAL RATE - MUSE: 55 BPM
DIASTOLIC BLOOD PRESSURE - MUSE: NORMAL MMHG
INTERPRETATION ECG - MUSE: NORMAL
P AXIS - MUSE: 40 DEGREES
PR INTERVAL - MUSE: 190 MS
QRS DURATION - MUSE: 86 MS
QT - MUSE: 408 MS
QTC - MUSE: 390 MS
R AXIS - MUSE: 18 DEGREES
SYSTOLIC BLOOD PRESSURE - MUSE: NORMAL MMHG
T AXIS - MUSE: 15 DEGREES
VENTRICULAR RATE- MUSE: 55 BPM

## 2021-08-14 ENCOUNTER — HEALTH MAINTENANCE LETTER (OUTPATIENT)
Age: 46
End: 2021-08-14

## 2021-10-09 ENCOUNTER — HEALTH MAINTENANCE LETTER (OUTPATIENT)
Age: 46
End: 2021-10-09

## 2022-09-17 ENCOUNTER — HEALTH MAINTENANCE LETTER (OUTPATIENT)
Age: 47
End: 2022-09-17

## 2023-10-08 ENCOUNTER — HEALTH MAINTENANCE LETTER (OUTPATIENT)
Age: 48
End: 2023-10-08